# Patient Record
Sex: FEMALE | Race: WHITE | NOT HISPANIC OR LATINO
[De-identification: names, ages, dates, MRNs, and addresses within clinical notes are randomized per-mention and may not be internally consistent; named-entity substitution may affect disease eponyms.]

---

## 2020-03-10 ENCOUNTER — RESULT REVIEW (OUTPATIENT)
Age: 60
End: 2020-03-10

## 2021-03-05 ENCOUNTER — TRANSCRIPTION ENCOUNTER (OUTPATIENT)
Age: 61
End: 2021-03-05

## 2022-04-13 ENCOUNTER — RESULT REVIEW (OUTPATIENT)
Age: 62
End: 2022-04-13

## 2022-09-14 ENCOUNTER — RESULT REVIEW (OUTPATIENT)
Age: 62
End: 2022-09-14

## 2023-03-24 ENCOUNTER — APPOINTMENT (OUTPATIENT)
Dept: ORTHOPEDIC SURGERY | Facility: CLINIC | Age: 63
End: 2023-03-24
Payer: COMMERCIAL

## 2023-03-24 VITALS — BODY MASS INDEX: 26.52 KG/M2 | HEIGHT: 66 IN | WEIGHT: 165 LBS

## 2023-03-24 DIAGNOSIS — S90.222A CONTUSION OF LEFT LESSER TOE(S) WITH DAMAGE TO NAIL, INITIAL ENCOUNTER: ICD-10-CM

## 2023-03-24 PROCEDURE — 99203 OFFICE O/P NEW LOW 30 MIN: CPT

## 2023-03-24 NOTE — ASSESSMENT
[FreeTextEntry1] : A/P L great toe subungal hematoma/tuft fracture\par - HSS discussed\par - WBAT\par - ice/elevation\par - return if pain or hematoma increases\par - f/u foot/ankle

## 2023-03-24 NOTE — IMAGING
[de-identified] : PE L great toe: small (<30%) subungal hematoma, +tenderness, able to flex/ext, nail intact, NVI [Left] : left toe [Outside films reviewed] : Outside films reviewed [de-identified] : L great toe distal phalanx tuft fracture

## 2023-03-24 NOTE — HISTORY OF PRESENT ILLNESS
[7] : 7 [de-identified] : 61 y/o F s/p jar fell on toe today with L great toe pain. denies numbness/tingling.\par has XRs from outside UC.

## 2023-03-29 ENCOUNTER — APPOINTMENT (OUTPATIENT)
Dept: ORTHOPEDIC SURGERY | Facility: CLINIC | Age: 63
End: 2023-03-29